# Patient Record
Sex: MALE | Race: BLACK OR AFRICAN AMERICAN | NOT HISPANIC OR LATINO | URBAN - METROPOLITAN AREA
[De-identification: names, ages, dates, MRNs, and addresses within clinical notes are randomized per-mention and may not be internally consistent; named-entity substitution may affect disease eponyms.]

---

## 2022-05-06 ENCOUNTER — EMERGENCY (EMERGENCY)
Facility: HOSPITAL | Age: 52
LOS: 1 days | Discharge: HOME | End: 2022-05-06
Attending: EMERGENCY MEDICINE | Admitting: EMERGENCY MEDICINE
Payer: COMMERCIAL

## 2022-05-06 VITALS
HEART RATE: 68 BPM | DIASTOLIC BLOOD PRESSURE: 95 MMHG | RESPIRATION RATE: 18 BRPM | SYSTOLIC BLOOD PRESSURE: 169 MMHG | OXYGEN SATURATION: 95 %

## 2022-05-06 VITALS
TEMPERATURE: 98 F | OXYGEN SATURATION: 94 % | WEIGHT: 231.93 LBS | DIASTOLIC BLOOD PRESSURE: 96 MMHG | HEART RATE: 68 BPM | SYSTOLIC BLOOD PRESSURE: 181 MMHG | RESPIRATION RATE: 16 BRPM

## 2022-05-06 PROCEDURE — 99283 EMERGENCY DEPT VISIT LOW MDM: CPT

## 2022-05-06 NOTE — ED PROVIDER NOTE - NSFOLLOWUPINSTRUCTIONS_ED_ALL_ED_FT
Surgical Procedures for Varicose Veins      Surgical procedures can be used to treat varicose veins. Varicose veins are swollen, twisted veins that are visible under the skin. They occur most often in the legs. These veins may appear blue and bulging.    Varicose veins are caused by damage to the valves in veins. All veins have a valve that keeps blood flowing in only one direction. If a valve gets weak or damaged, blood can pool and cause varicose veins.    You may need surgery to treat your varicose veins if they are causing symptoms or complications, or if lifestyle changes have not helped. These procedures can lower the risk of bleeding and blood clots, and reduce pain and aching. They can also improve the way the affected area looks (cosmetic appearance).    Two common surgical procedures are:  •Phlebectomy. The vein is surgically removed through a small incision. This procedure is used to remove the veins closest to the skin.      •Vein ligation and stripping. The vein is surgically removed through incisions after the vein has been tied off. This procedure is used to treat severe cases.      Based on your overall condition, your health care provider will discuss the method that is best for you.      Tell a health care provider about:    •Any allergies you have.      •All medicines you are taking, including vitamins, herbs, eye drops, creams, and over-the-counter medicines.      •Any problems you or family members have had with anesthetic medicines.      •Any blood disorders you have.      •Any surgeries you have had.      •Any medical conditions you have.      •Whether you are pregnant or may be pregnant.        What are the risks?    Generally, this is a safe procedure. However, problems may occur, including:  •Damage to nearby nerves, tissues, or veins.      •Skin irritation, sores, or dark spots.      •Numbness.      •Clotting.      •Infection.      •Allergic reactions to medicines.      •Scarring.      •Leg swelling.      •Need for additional treatments.        What happens before the procedure?    •Follow instructions from your health care provider about eating or drinking restrictions.    •Ask your health care provider about:  •Changing or stopping your regular medicines. This is especially important if you are taking diabetes medicines or blood thinners.      •Taking over-the-counter medicines, vitamins, herbs, and supplements.      •Taking medicines such as aspirin and ibuprofen. These medicines can thin your blood. Do not take these medicines unless your health care provider tells you to take them.      •You may have an exam or testing. This can include a test to:  •Check for clots and check blood flow using sound waves (Doppler ultrasound).      •Observe how blood flows through your veins by injecting a dye that outlines your veins on X-rays (angiogram). This test is used in rare cases.        •Plan to have someone take you home from the hospital or clinic.      •Ask your health care provider how your surgical site will be marked or identified.      •You may be given antibiotic medicine to help prevent infection.      •You may be asked to shower with a germ-killing soap.        What happens during the procedure?  •To lower your risk of infection:  •Your health care team will wash or sanitize their hands.      •Hair may be removed from the surgical area.      •Your skin will be washed with soap.        •One of the following procedures will be performed:        Phlebectomy      •You will be given a medicine to numb the area (local anesthetic).      •A small puncture will be made close to each of the varicose veins.      •A tiny hook will be used to pull out the vein.      •Bandages (dressings) or adhesive strips will be used to cover the puncture sites.      Vein ligation and stripping     •You will be given a medicine to make you fall asleep (general anesthetic).      •A small incision will be made near the vein in your groin (saphenous vein).      •The surgeon will tie off (ligate) the vein.      •Several more incisions will then be made along the vein.      •The vein will be removed (stripped).      •The incisions will be closed with stitches (sutures) under the skin.      •Bandages or adhesive strips will be used to cover the incision sites.        What happens after the procedure?    •Your blood pressure, heart rate, breathing rate, and blood oxygen level will be monitored until the medicines you were given have worn off.      •You may have to wear compression stockings. These stockings help to prevent blood clots and reduce swelling in your legs.        Summary    •Varicose veins are swollen, twisted veins that are visible under the skin. They occur most often in the legs.      •You may need surgery to treat your varicose veins if they are causing symptoms or complications, or if lifestyle changes have not helped.      •Your health care provider will discuss the method that is best for you based on your condition.      This information is not intended to replace advice given to you by your health care provider. Make sure you discuss any questions you have with your health care provider.

## 2022-05-06 NOTE — ED ADULT NURSE NOTE - ALCOHOL PRE SCREEN (AUDIT - C)
Pt has called to scheduled her Flex Sig for 3/15.  Does she need to hold her Eliquis?   Statement Selected

## 2022-05-06 NOTE — ED PROVIDER NOTE - CARE PROVIDER_API CALL
Brianna Saha)  Vascular Surgery  [address] [address]  Phone: [phone]  Fax: [phone]  Follow Up Time: Routine

## 2022-05-06 NOTE — ED PROVIDER NOTE - CLINICAL SUMMARY MEDICAL DECISION MAKING FREE TEXT BOX
52 y/o male presents with spontaneous bleeding from the superficial left leg varicose vein. Bleeding controlled upon evaluation. Skin treated with Dermabond. Will recommend elective outpatient vascular surgery follow up.

## 2022-05-06 NOTE — ED ADULT NURSE NOTE - PRO INTERPRETER NEED 2
[TextBox_4] : healthy man with reflux.\par \par years of reflux and coughing\par \par went to er and had chest film ,  covid negative., ekgs.\par \par just vinegar. and water  alfred found tons of fluid in nasal, gi endoscaopy   placed on prilosec and nasal sprary\par \par thought there was a nasal drip tht triggered it,  \par \par suzbin.  tons of nasal mucous.\par \par irritated lining.  dry for two or weeks getting a bit colored  and productivenow..  cough is getting better.   cough  English

## 2022-05-06 NOTE — ED PROVIDER NOTE - PATIENT PORTAL LINK FT
You can access the FollowMyHealth Patient Portal offered by [facility] by registering at the following website: [email or web address]. By joining [facility]’s FollowMyHealth portal, you will also be able to view your health information using other applications (apps) compatible with our system.

## 2022-05-06 NOTE — ED ADULT NURSE NOTE - OBJECTIVE STATEMENT
Pt came in c/o of bleeding left upper leg varicose vein bleeding. Bleeding controlled and dressed by md. MCKENZIE&Ox3 speaking in full sentences. NAD

## 2022-05-06 NOTE — ED PROVIDER NOTE - PHYSICAL EXAMINATION
VITAL SIGNS: I have reviewed nursing notes and confirm.  CONSTITUTIONAL: Well-developed; well-nourished; in no acute distress.  SKIN: See EXT. Otherwise, skin is warm and dry, no acute rash.  HEAD: Normocephalic; atraumatic.  EYES: PERRL, EOM intact; conjunctiva and sclera clear.  ENT: No nasal discharge; airway clear.  NECK: Supple; non tender.  ABD: Normal bowel sounds; soft; non-distended; non-tender; no hepatosplenomegaly.  EXT: Normal ROM. No clubbing, cyanosis or edema. +Apparent superficial varicose vein to the left upper lateral leg, bleeding has stopped at this time. No tenderness, or erythema surrounding the wound.   NEURO: Alert, oriented. Grossly unremarkable.  PSYCH: Cooperative, appropriate. - - -

## 2022-05-06 NOTE — ED PROVIDER NOTE - OBJECTIVE STATEMENT
50 y/o male presents to the [facility] complaining of bleeding from a varicose vein in the left upper leg which he first noticed just prior to arrival. Patient states blood was "squirting" and EMS applied pressure dressing. Patient with no other complaints at this time. Denies lightheadedness, dizziness. Patient states this happened 1 x before, 8 years ago at which time he had sutures placed to stop the bleeding. Patient has never seen a vascular surgeon for this problem in the past.

## 2022-05-09 DIAGNOSIS — I83.892 VARICOSE VEINS OF LEFT LOWER EXTREMITY WITH OTHER COMPLICATIONS: ICD-10-CM

## 2022-05-09 DIAGNOSIS — Z88.0 ALLERGY STATUS TO PENICILLIN: ICD-10-CM

## 2022-10-11 PROBLEM — I83.90 ASYMPTOMATIC VARICOSE VEINS OF UNSPECIFIED LOWER EXTREMITY: Chronic | Status: ACTIVE | Noted: 2022-05-06

## 2022-10-12 ENCOUNTER — APPOINTMENT (OUTPATIENT)
Dept: ORTHOPEDIC SURGERY | Facility: CLINIC | Age: 52
End: 2022-10-12

## 2022-10-12 VITALS — BODY MASS INDEX: 41.75 KG/M2 | WEIGHT: 315 LBS | HEIGHT: 73 IN

## 2022-10-12 DIAGNOSIS — S83.412A SPRAIN OF MEDIAL COLLATERAL LIGAMENT OF LEFT KNEE, INITIAL ENCOUNTER: ICD-10-CM

## 2022-10-12 DIAGNOSIS — S83.402A SPRAIN OF UNSPECIFIED COLLATERAL LIGAMENT OF LEFT KNEE, INITIAL ENCOUNTER: ICD-10-CM

## 2022-10-12 DIAGNOSIS — Z86.79 PERSONAL HISTORY OF OTHER DISEASES OF THE CIRCULATORY SYSTEM: ICD-10-CM

## 2022-10-12 DIAGNOSIS — Z86.39 PERSONAL HISTORY OF OTHER ENDOCRINE, NUTRITIONAL AND METABOLIC DISEASE: ICD-10-CM

## 2022-10-12 PROBLEM — Z00.00 ENCOUNTER FOR PREVENTIVE HEALTH EXAMINATION: Status: ACTIVE | Noted: 2022-10-12

## 2022-10-12 PROCEDURE — 76882 US LMTD JT/FCL EVL NVASC XTR: CPT

## 2022-10-12 PROCEDURE — 20611 DRAIN/INJ JOINT/BURSA W/US: CPT

## 2022-10-12 PROCEDURE — 99072 ADDL SUPL MATRL&STAF TM PHE: CPT

## 2022-10-12 PROCEDURE — 99204 OFFICE O/P NEW MOD 45 MIN: CPT | Mod: 25

## 2022-10-12 PROCEDURE — J3490M: CUSTOM

## 2022-10-12 RX ORDER — EMPAGLIFLOZIN 25 MG/1
TABLET, FILM COATED ORAL
Refills: 0 | Status: ACTIVE | COMMUNITY

## 2022-10-12 RX ORDER — SITAGLIPTIN AND METFORMIN HYDROCHLORIDE 50; 1000 MG/1; MG/1
TABLET, FILM COATED ORAL
Refills: 0 | Status: ACTIVE | COMMUNITY

## 2022-10-12 RX ORDER — SEMAGLUTIDE 0.68 MG/ML
INJECTION, SOLUTION SUBCUTANEOUS
Refills: 0 | Status: ACTIVE | COMMUNITY

## 2022-10-12 RX ORDER — CLONIDINE HYDROCHLORIDE 0.3 MG/1
TABLET ORAL
Refills: 0 | Status: ACTIVE | COMMUNITY

## 2022-10-12 RX ORDER — METOPROLOL TARTRATE 75 MG/1
TABLET, FILM COATED ORAL
Refills: 0 | Status: ACTIVE | COMMUNITY

## 2022-10-12 RX ORDER — VALSARTAN 40 MG/1
TABLET, COATED ORAL
Refills: 0 | Status: ACTIVE | COMMUNITY

## 2022-10-12 RX ORDER — HYDROCHLOROTHIAZIDE 12.5 MG/1
TABLET ORAL
Refills: 0 | Status: ACTIVE | COMMUNITY

## 2022-10-12 RX ORDER — ROSUVASTATIN CALCIUM 5 MG/1
TABLET, FILM COATED ORAL
Refills: 0 | Status: ACTIVE | COMMUNITY

## 2022-10-12 NOTE — PHYSICAL EXAM
[5___] : hamstring 5[unfilled]/5 [Positive] : positive Osito [Left] : left knee [All Views] : anteroposterior, lateral, skyline, and anteroposterior standing [Outside films reviewed] : Outside films reviewed [There are no fractures, subluxations or dislocations. No significant abnormalities are seen] : There are no fractures, subluxations or dislocations. No significant abnormalities are seen [Degenerative change] : Degenerative change [] : no calf tenderness [FreeTextEntry9] : sl [TWNoteComboBox7] : flexion 115 degrees [de-identified] : extension 0 degrees

## 2022-10-12 NOTE — WORK
complains of pain/discomfort [Sprain/Strain] : sprain/strain [Was the competent medical cause of the injury] : was the competent medical cause of the injury [Are consistent with the injury] : are consistent with the injury [Consistent with my objective findings] : consistent with my objective findings [Total] : total [Does not reveal pre-existing condition(s) that may affect treatment/prognosis] : does not reveal pre-existing condition(s) that may affect treatment/prognosis [Cannot return to work because ________] : cannot return to work because [unfilled] [Unknown at this time] : : unknown at this time [Patient] : patient [No Rx restrictions] : No Rx restrictions. [I provided the services listed above] :  I provided the services listed above. [FreeTextEntry1] : guarded

## 2022-10-12 NOTE — PROCEDURE
[Large Joint Injection] : Large joint injection [Left] : of the left [Knee] : knee [] : Patient tolerated procedure well [Call if redness, pain or fever occur] : call if redness, pain or fever occur [Apply ice for 15min out of every hour for the next 12-24 hours as tolerated] : apply ice for 15 minutes out of every hour for the next 12-24 hours as tolerated [Patient was advised to rest the joint(s) for ____ days] : patient was advised to rest the joint(s) for [unfilled] days [All ultrasound images have been permanently captured and stored accordingly in our picture archiving and communication system] : All ultrasound images have been permanently captured and stored accordingly in our picture archiving and communication system [Pain] : pain [Inflammation] : inflammation [Effusion] : effusion [de-identified] : 100 ccs [de-identified] : clear

## 2022-10-12 NOTE — HISTORY OF PRESENT ILLNESS
[Work related] : work related [Sudden] : sudden [9] : 9 [3] : 3 [Sharp] : sharp [Rest] : rest [Meds] : meds [Ice] : ice [Not working due to injury] : Work status: not working due to injury [de-identified] : 52 year old male with pain in the left knee, he was at work on 10/8/22, he twisted his left knee after stepping over electric box on floor and developed acute pain in the knee, swelling. He was seen at [facility] ER, underwent Xrays and discharged with Ibuprofen. He did not take the IBU as it elevates his BP. He has pain with walking, stairs, getting up from a seated position. Occasional buckling. No prior history of injury to the knee Has not worked since, he is an . [] : no [FreeTextEntry1] : left knee [FreeTextEntry3] : 10/08/2022 [FreeTextEntry5] : pt was walking and he tripped over an electrical floor cell and his knee twisted, he has been having issues with walking since then [FreeTextEntry9] : tylenol  [de-identified] : movement  [de-identified] : 10/08/2022 [de-identified] : [facility] ER

## 2022-10-12 NOTE — DISCUSSION/SUMMARY
[de-identified] : Patient allowed to gently start resuming activities.\par Discussed change to medication prescription and usage. \par Offered cortisone steroid injection. \par Bracing options discussed with patient. \par CT arthrogram left knee eval for meniscal tear, shrapnel in skull\par \par RE:  MARKO TUCKER \par \par Acct #- [id number] \par \par Attention:  Nurse Reviewer /Medical Director\par \par  \par Based on my patient's condition, I strongly believe that the CT arthrogram  L knee is medically.necessary.  \par The patient has failed oral meds, injections and PT and conservative treatment in combination or by themselves and therefore needs the MRI.  \par The MRI will dictate further treatment t recommendations.\par

## 2022-10-13 ENCOUNTER — NON-APPOINTMENT (OUTPATIENT)
Age: 52
End: 2022-10-13

## 2022-10-26 ENCOUNTER — APPOINTMENT (OUTPATIENT)
Dept: ORTHOPEDIC SURGERY | Facility: CLINIC | Age: 52
End: 2022-10-26

## 2022-10-26 VITALS — BODY MASS INDEX: 41.75 KG/M2 | WEIGHT: 315 LBS | HEIGHT: 73 IN

## 2022-10-26 DIAGNOSIS — M25.461 EFFUSION, RIGHT KNEE: ICD-10-CM

## 2022-10-26 DIAGNOSIS — M17.11 UNILATERAL PRIMARY OSTEOARTHRITIS, RIGHT KNEE: ICD-10-CM

## 2022-10-26 PROCEDURE — 99214 OFFICE O/P EST MOD 30 MIN: CPT | Mod: 25

## 2022-10-26 PROCEDURE — 20610 DRAIN/INJ JOINT/BURSA W/O US: CPT | Mod: RT

## 2022-10-26 PROCEDURE — 73564 X-RAY EXAM KNEE 4 OR MORE: CPT | Mod: RT

## 2022-10-26 PROCEDURE — J3490M: CUSTOM

## 2022-10-26 NOTE — ASSESSMENT
[FreeTextEntry1] : will aspirate and administer csi today f/u 6 weeks consider ha injections if pain persists

## 2022-10-26 NOTE — PROCEDURE
[Large Joint Injection] : Large joint injection [Right] : of the right [Knee] : knee [Pain] : pain [Inflammation] : inflammation [X-ray evidence of Osteoarthritis on this or prior visit] : x-ray evidence of Osteoarthritis on this or prior visit [Alcohol] : alcohol [Betadine] : betadine [Ethyl Chloride sprayed topically] : ethyl chloride sprayed topically [Sterile technique used] : sterile technique used [___ cc    6mg] :  Betamethasone (Celestone) ~Vcc of 6mg [___ cc    1%] : Lidocaine ~Vcc of 1%  [Effusion] : effusion [] : Patient tolerated procedure well [Call if redness, pain or fever occur] : call if redness, pain or fever occur [Apply ice for 15min out of every hour for the next 12-24 hours as tolerated] : apply ice for 15 minutes out of every hour for the next 12-24 hours as tolerated [Patient was advised to rest the joint(s) for ____ days] : patient was advised to rest the joint(s) for [unfilled] days [Previous OTC use and PT nontherapeutic] : patient has tried OTC's including aspirin, Ibuprofen, Aleve, etc or prescription NSAIDS, and/or exercises at home and/or physical therapy without satisfactory response [Patient had decreased mobility in the joint] : patient had decreased mobility in the joint [Risks, benefits, alternatives discussed / Verbal consent obtained] : the risks benefits, and alternatives have been discussed, and verbal consent was obtained [de-identified] : 70cc [de-identified] : clear yellow

## 2022-10-26 NOTE — PHYSICAL EXAM
[Right] : right knee [NL (0)] : extension 0 degrees [5___] : hamstring 5[unfilled]/5 [Equivocal] : equivocal Osito [] : negative Lachmann [TWNoteComboBox7] : flexion 120 degrees

## 2022-10-26 NOTE — REASON FOR VISIT
[FreeTextEntry2] : 10/26/2022 This is a  52 year male present for Rt Knee, no injury , pain for a months or so, icing and Tylenol \par

## 2022-10-26 NOTE — HISTORY OF PRESENT ILLNESS
[Gradual] : gradual [Result of repetitive motion] : result of repetitive motion [8] : 8 [5] : 5 [Burning] : burning [Dull/Aching] : dull/aching [Occasional] : occasional [Standing] : standing [Walking] : walking [Exercising] : exercising [Stairs] : stairs [de-identified] : 10-26-22- 2+ month h/o medially based right knee pain and swelling. denies injury. symptoms worse with stairs and start up after prolonged sitting. He has tried apap without help\par \par notes a few weeks ago did well with left knee asp and csi from Dr Sinha\par \par works as \par \par PM obesity, htn and dm controlled with oral meds [] : no [de-identified] : Tylenol and Ice

## 2023-05-25 NOTE — ED ADULT NURSE NOTE - NS PRO PASSIVE SMOKE EXP
Quality 431: Preventive Care And Screening: Unhealthy Alcohol Use - Screening: Patient not identified as an unhealthy alcohol user when screened for unhealthy alcohol use using a systematic screening method Quality 110: Preventive Care And Screening: Influenza Immunization: Influenza Immunization Administered during Influenza season Quality 226: Preventive Care And Screening: Tobacco Use: Screening And Cessation Intervention: Patient screened for tobacco use and is an ex/non-smoker Quality 130: Documentation Of Current Medications In The Medical Record: Current Medications Documented Detail Level: Detailed Unknown